# Patient Record
Sex: MALE | Race: WHITE | NOT HISPANIC OR LATINO | Employment: OTHER | URBAN - METROPOLITAN AREA
[De-identification: names, ages, dates, MRNs, and addresses within clinical notes are randomized per-mention and may not be internally consistent; named-entity substitution may affect disease eponyms.]

---

## 2017-07-21 ENCOUNTER — APPOINTMENT (OUTPATIENT)
Dept: LAB | Facility: MEDICAL CENTER | Age: 77
End: 2017-07-21
Attending: FAMILY MEDICINE
Payer: MEDICARE

## 2019-11-06 ENCOUNTER — HOSPITAL ENCOUNTER (OUTPATIENT)
Dept: HOSPITAL 8 - CVU | Age: 79
Discharge: HOME | End: 2019-11-06
Attending: NURSE PRACTITIONER
Payer: MEDICARE

## 2019-11-06 DIAGNOSIS — I34.0: Primary | ICD-10-CM

## 2019-11-06 DIAGNOSIS — R55: ICD-10-CM

## 2019-11-06 PROCEDURE — 93880 EXTRACRANIAL BILAT STUDY: CPT

## 2019-11-06 PROCEDURE — 93306 TTE W/DOPPLER COMPLETE: CPT

## 2019-12-18 VITALS — DIASTOLIC BLOOD PRESSURE: 63 MMHG | SYSTOLIC BLOOD PRESSURE: 127 MMHG

## 2019-12-18 LAB
ANION GAP SERPL CALC-SCNC: 7 MMOL/L (ref 5–15)
BASOPHILS # BLD AUTO: 0.06 X10^3/UL (ref 0–0.1)
BASOPHILS NFR BLD AUTO: 1 % (ref 0–1)
CALCIUM SERPL-MCNC: 8.7 MG/DL (ref 8.5–10.1)
CHLORIDE SERPL-SCNC: 110 MMOL/L (ref 98–107)
CREAT SERPL-MCNC: 0.8 MG/DL (ref 0.7–1.3)
EOSINOPHIL # BLD AUTO: 0.71 X10^3/UL (ref 0–0.4)
EOSINOPHIL NFR BLD AUTO: 13 % (ref 1–7)
ERYTHROCYTE [DISTWIDTH] IN BLOOD BY AUTOMATED COUNT: 13.2 % (ref 9.4–14.8)
LYMPHOCYTES # BLD AUTO: 1.43 X10^3/UL (ref 1–3.4)
LYMPHOCYTES NFR BLD AUTO: 26 % (ref 22–44)
MCH RBC QN AUTO: 32.5 PG (ref 27.5–34.5)
MCHC RBC AUTO-ENTMCNC: 33.6 G/DL (ref 33.2–36.2)
MCV RBC AUTO: 96.6 FL (ref 81–97)
MD: NO
MONOCYTES # BLD AUTO: 0.71 X10^3/UL (ref 0.2–0.8)
MONOCYTES NFR BLD AUTO: 13 % (ref 2–9)
NEUTROPHILS # BLD AUTO: 2.56 X10^3/UL (ref 1.8–6.8)
NEUTROPHILS NFR BLD AUTO: 47 % (ref 42–75)
PLATELET # BLD AUTO: 173 X10^3/UL (ref 130–400)
PMV BLD AUTO: 7.6 FL (ref 7.4–10.4)
RBC # BLD AUTO: 4.88 X10^6/UL (ref 4.38–5.82)

## 2019-12-20 ENCOUNTER — HOSPITAL ENCOUNTER (OUTPATIENT)
Dept: HOSPITAL 8 - CACL | Age: 79
Setting detail: OBSERVATION
LOS: 1 days | Discharge: HOME | End: 2019-12-21
Attending: INTERNAL MEDICINE | Admitting: INTERNAL MEDICINE
Payer: MEDICARE

## 2019-12-20 VITALS — BODY MASS INDEX: 24.71 KG/M2 | HEIGHT: 70 IN | WEIGHT: 172.62 LBS

## 2019-12-20 VITALS — DIASTOLIC BLOOD PRESSURE: 85 MMHG | SYSTOLIC BLOOD PRESSURE: 126 MMHG

## 2019-12-20 VITALS — SYSTOLIC BLOOD PRESSURE: 120 MMHG | DIASTOLIC BLOOD PRESSURE: 80 MMHG

## 2019-12-20 DIAGNOSIS — I49.5: ICD-10-CM

## 2019-12-20 DIAGNOSIS — I44.1: Primary | ICD-10-CM

## 2019-12-20 DIAGNOSIS — R55: ICD-10-CM

## 2019-12-20 DIAGNOSIS — I34.0: ICD-10-CM

## 2019-12-20 DIAGNOSIS — Z79.899: ICD-10-CM

## 2019-12-20 DIAGNOSIS — G47.30: ICD-10-CM

## 2019-12-20 DIAGNOSIS — I26.09: ICD-10-CM

## 2019-12-20 DIAGNOSIS — Z79.82: ICD-10-CM

## 2019-12-20 DIAGNOSIS — I82.409: ICD-10-CM

## 2019-12-20 DIAGNOSIS — I82.411: ICD-10-CM

## 2019-12-20 DIAGNOSIS — E03.9: ICD-10-CM

## 2019-12-20 PROCEDURE — G0378 HOSPITAL OBSERVATION PER HR: HCPCS

## 2019-12-20 PROCEDURE — 36415 COLL VENOUS BLD VENIPUNCTURE: CPT

## 2019-12-20 PROCEDURE — 33208 INSRT HEART PM ATRIAL & VENT: CPT

## 2019-12-20 PROCEDURE — 85025 COMPLETE CBC W/AUTO DIFF WBC: CPT

## 2019-12-20 PROCEDURE — 99157 MOD SED OTHER PHYS/QHP EA: CPT

## 2019-12-20 PROCEDURE — 80048 BASIC METABOLIC PNL TOTAL CA: CPT

## 2019-12-20 PROCEDURE — 99156 MOD SED OTH PHYS/QHP 5/>YRS: CPT

## 2019-12-20 PROCEDURE — 71045 X-RAY EXAM CHEST 1 VIEW: CPT

## 2019-12-20 PROCEDURE — C1779 LEAD, PMKR, TRANSVENOUS VDD: HCPCS

## 2019-12-20 PROCEDURE — C1785 PMKR, DUAL, RATE-RESP: HCPCS

## 2019-12-20 PROCEDURE — 71046 X-RAY EXAM CHEST 2 VIEWS: CPT

## 2019-12-20 PROCEDURE — C1892 INTRO/SHEATH,FIXED,PEEL-AWAY: HCPCS

## 2019-12-20 PROCEDURE — 36005 INJECTION EXT VENOGRAPHY: CPT

## 2019-12-20 RX ADMIN — SODIUM CHLORIDE, PRESERVATIVE FREE SCH ML: 5 INJECTION INTRAVENOUS at 22:00

## 2019-12-21 VITALS — SYSTOLIC BLOOD PRESSURE: 148 MMHG | DIASTOLIC BLOOD PRESSURE: 84 MMHG

## 2019-12-21 VITALS — DIASTOLIC BLOOD PRESSURE: 90 MMHG | SYSTOLIC BLOOD PRESSURE: 143 MMHG

## 2019-12-21 RX ADMIN — SODIUM CHLORIDE, PRESERVATIVE FREE SCH ML: 5 INJECTION INTRAVENOUS at 09:00

## 2020-09-09 ENCOUNTER — HOSPITAL ENCOUNTER (OUTPATIENT)
Dept: HOSPITAL 8 - RAD | Age: 80
Discharge: HOME | End: 2020-09-09
Attending: NURSE PRACTITIONER
Payer: MEDICARE

## 2020-09-09 DIAGNOSIS — G31.9: ICD-10-CM

## 2020-09-09 DIAGNOSIS — I99.8: ICD-10-CM

## 2020-09-09 DIAGNOSIS — Z91.81: ICD-10-CM

## 2020-09-09 DIAGNOSIS — I63.81: Primary | ICD-10-CM

## 2020-09-09 PROCEDURE — 70551 MRI BRAIN STEM W/O DYE: CPT

## 2021-01-15 DIAGNOSIS — Z23 NEED FOR VACCINATION: ICD-10-CM

## 2021-08-04 ENCOUNTER — APPOINTMENT (RX ONLY)
Dept: URBAN - METROPOLITAN AREA CLINIC 22 | Facility: CLINIC | Age: 81
Setting detail: DERMATOLOGY
End: 2021-08-04

## 2021-08-04 DIAGNOSIS — L82.0 INFLAMED SEBORRHEIC KERATOSIS: ICD-10-CM

## 2021-08-04 DIAGNOSIS — D18.0 HEMANGIOMA: ICD-10-CM

## 2021-08-04 DIAGNOSIS — L57.0 ACTINIC KERATOSIS: ICD-10-CM

## 2021-08-04 DIAGNOSIS — D22 MELANOCYTIC NEVI: ICD-10-CM

## 2021-08-04 DIAGNOSIS — Z71.89 OTHER SPECIFIED COUNSELING: ICD-10-CM

## 2021-08-04 DIAGNOSIS — L82.1 OTHER SEBORRHEIC KERATOSIS: ICD-10-CM

## 2021-08-04 DIAGNOSIS — L81.4 OTHER MELANIN HYPERPIGMENTATION: ICD-10-CM

## 2021-08-04 DIAGNOSIS — I83.9 ASYMPTOMATIC VARICOSE VEINS OF LOWER EXTREMITIES: ICD-10-CM

## 2021-08-04 PROBLEM — D18.01 HEMANGIOMA OF SKIN AND SUBCUTANEOUS TISSUE: Status: ACTIVE | Noted: 2021-08-04

## 2021-08-04 PROBLEM — D22.5 MELANOCYTIC NEVI OF TRUNK: Status: ACTIVE | Noted: 2021-08-04

## 2021-08-04 PROBLEM — I83.93 ASYMPTOMATIC VARICOSE VEINS OF BILATERAL LOWER EXTREMITIES: Status: ACTIVE | Noted: 2021-08-04

## 2021-08-04 PROCEDURE — 17000 DESTRUCT PREMALG LESION: CPT | Mod: 59

## 2021-08-04 PROCEDURE — 17003 DESTRUCT PREMALG LES 2-14: CPT | Mod: 59

## 2021-08-04 PROCEDURE — ? LIQUID NITROGEN

## 2021-08-04 PROCEDURE — 99203 OFFICE O/P NEW LOW 30 MIN: CPT | Mod: 25

## 2021-08-04 PROCEDURE — ? COUNSELING

## 2021-08-04 PROCEDURE — ? SUNSCREEN RECOMMENDATIONS

## 2021-08-04 PROCEDURE — 17110 DESTRUCTION B9 LES UP TO 14: CPT

## 2021-08-04 ASSESSMENT — LOCATION SIMPLE DESCRIPTION DERM
LOCATION SIMPLE: LEFT EAR
LOCATION SIMPLE: RIGHT LOWER BACK
LOCATION SIMPLE: LEFT FOREHEAD
LOCATION SIMPLE: LEFT PRETIBIAL REGION
LOCATION SIMPLE: RIGHT PRETIBIAL REGION
LOCATION SIMPLE: RIGHT FOREHEAD
LOCATION SIMPLE: ABDOMEN
LOCATION SIMPLE: LEFT UPPER BACK

## 2021-08-04 ASSESSMENT — LOCATION DETAILED DESCRIPTION DERM
LOCATION DETAILED: LEFT MEDIAL FOREHEAD
LOCATION DETAILED: LEFT SUPERIOR MEDIAL UPPER BACK
LOCATION DETAILED: LEFT RIB CAGE
LOCATION DETAILED: LEFT PROXIMAL PRETIBIAL REGION
LOCATION DETAILED: LEFT SUPERIOR HELIX
LOCATION DETAILED: RIGHT PROXIMAL PRETIBIAL REGION
LOCATION DETAILED: RIGHT SUPERIOR MEDIAL MIDBACK
LOCATION DETAILED: XIPHOID
LOCATION DETAILED: RIGHT INFERIOR FOREHEAD
LOCATION DETAILED: EPIGASTRIC SKIN

## 2021-08-04 ASSESSMENT — LOCATION ZONE DERM
LOCATION ZONE: TRUNK
LOCATION ZONE: FACE
LOCATION ZONE: LEG
LOCATION ZONE: EAR

## 2021-08-04 NOTE — PROCEDURE: LIQUID NITROGEN
Render Note In Bullet Format When Appropriate: No
Post-Care Instructions: I reviewed with the patient in detail post-care instructions. Patient is to wear sunprotection, and avoid picking at any of the treated lesions. Pt may apply Vaseline to crusted or scabbing areas.
Duration Of Freeze Thaw-Cycle (Seconds): 3
Detail Level: Detailed
Number Of Freeze-Thaw Cycles: 2 freeze-thaw cycles
Show Aperture Variable?: Yes
Consent: The patient's consent was obtained including but not limited to risks of crusting, scabbing, blistering, scarring, darker or lighter pigmentary change, recurrence, incomplete removal and infection.
Medical Necessity Information: It is in your best interest to select a reason for this procedure from the list below. All of these items fulfill various CMS LCD requirements except the new and changing color options.
Medical Necessity Clause: This procedure was medically necessary because the lesions that were treated were:
Number Of Freeze-Thaw Cycles: 3 freeze-thaw cycles

## 2021-12-14 ENCOUNTER — APPOINTMENT (RX ONLY)
Dept: URBAN - METROPOLITAN AREA CLINIC 6 | Facility: CLINIC | Age: 81
Setting detail: DERMATOLOGY
End: 2021-12-14

## 2021-12-14 DIAGNOSIS — L72.0 EPIDERMAL CYST: ICD-10-CM

## 2021-12-14 PROCEDURE — 10060 I&D ABSCESS SIMPLE/SINGLE: CPT

## 2021-12-14 PROCEDURE — ? COUNSELING

## 2021-12-14 PROCEDURE — ? ADDITIONAL NOTES

## 2021-12-14 PROCEDURE — ? INCISION AND DRAINAGE

## 2021-12-14 ASSESSMENT — LOCATION SIMPLE DESCRIPTION DERM: LOCATION SIMPLE: LEFT AXILLARY VAULT

## 2021-12-14 ASSESSMENT — LOCATION DETAILED DESCRIPTION DERM: LOCATION DETAILED: LEFT AXILLARY VAULT

## 2021-12-14 ASSESSMENT — LOCATION ZONE DERM: LOCATION ZONE: AXILLAE

## 2021-12-14 NOTE — PROCEDURE: INCISION AND DRAINAGE

## 2021-12-14 NOTE — PROCEDURE: ADDITIONAL NOTES
Additional Notes: Streaky erythema extending from inflamed epidermoid cyst, currently taking doxycycline 100mg BID (day 6). \\n\\nRecommended loose dressing to promote drainage, applied folded 4x4 gauze and secured with paper tape. Recommended changing at least once daily or when visibly soiled with blood. \\n\\nWill follow up in one week and if lack improvement, will consider course of Bactrim +/- ILK
Render Risk Assessment In Note?: no
Detail Level: Detailed

## 2021-12-22 ENCOUNTER — APPOINTMENT (RX ONLY)
Dept: URBAN - METROPOLITAN AREA CLINIC 6 | Facility: CLINIC | Age: 81
Setting detail: DERMATOLOGY
End: 2021-12-22

## 2021-12-22 DIAGNOSIS — L72.0 EPIDERMAL CYST: ICD-10-CM

## 2021-12-22 PROCEDURE — 11900 INJECT SKIN LESIONS </W 7: CPT | Mod: 79

## 2021-12-22 PROCEDURE — ? COUNSELING

## 2021-12-22 PROCEDURE — ? INTRALESIONAL KENALOG

## 2021-12-22 PROCEDURE — ? ADDITIONAL NOTES

## 2021-12-22 ASSESSMENT — LOCATION SIMPLE DESCRIPTION DERM: LOCATION SIMPLE: LEFT AXILLARY VAULT

## 2021-12-22 ASSESSMENT — LOCATION ZONE DERM: LOCATION ZONE: AXILLAE

## 2021-12-22 ASSESSMENT — LOCATION DETAILED DESCRIPTION DERM: LOCATION DETAILED: LEFT AXILLARY VAULT

## 2021-12-22 NOTE — PROCEDURE: INTRALESIONAL KENALOG
Administered By (Optional): Dr. Del Real
Detail Level: Detailed
Consent: The risks of atrophy were reviewed with the patient.
X Size Of Lesion In Cm (Optional): 0
Expiration Date For Kenalog (Optional): MAR 2023
Total Volume (Ccs): 1.0
Kenalog Preparation: Kenalog
Validate Note Data When Using Inventory: Yes
Concentration Of Kenalog Solution Injected (Mg/Ml): 10.0
Include Z78.9 (Other Specified Conditions Influencing Health Status) As An Associated Diagnosis?: No
Lot # For Kenalog (Optional): [id number]
Ndc# For Kenalog Only: 9372-7210-87
Medical Necessity Clause: This procedure was medically necessary because the lesions that were treated were:
Treatment Number (Optional): 1

## 2021-12-22 NOTE — PROCEDURE: ADDITIONAL NOTES
Additional Notes: No current signs of infection, recently finished course of doxycycline followed by I&D one week ago. Due to residual inflammation, performed ILK and will monitor for resolution.
Render Risk Assessment In Note?: no
Detail Level: Detailed

## 2022-01-20 ENCOUNTER — APPOINTMENT (RX ONLY)
Dept: URBAN - METROPOLITAN AREA CLINIC 6 | Facility: CLINIC | Age: 82
Setting detail: DERMATOLOGY
End: 2022-01-20

## 2022-01-20 DIAGNOSIS — L72.0 EPIDERMAL CYST: ICD-10-CM | Status: RESOLVED

## 2022-01-20 PROCEDURE — 99212 OFFICE O/P EST SF 10 MIN: CPT

## 2022-01-20 PROCEDURE — ? COUNSELING

## 2022-01-20 ASSESSMENT — LOCATION DETAILED DESCRIPTION DERM: LOCATION DETAILED: LEFT AXILLARY VAULT

## 2022-01-20 ASSESSMENT — LOCATION SIMPLE DESCRIPTION DERM: LOCATION SIMPLE: LEFT AXILLARY VAULT

## 2022-01-20 ASSESSMENT — LOCATION ZONE DERM: LOCATION ZONE: AXILLAE

## 2022-09-13 PROBLEM — N40.1 BENIGN PROSTATIC HYPERPLASIA WITH URINARY FREQUENCY: Status: ACTIVE | Noted: 2022-09-13

## 2022-09-13 PROBLEM — Z86.73 HISTORY OF CVA (CEREBROVASCULAR ACCIDENT): Status: ACTIVE | Noted: 2022-09-13

## 2022-09-13 PROBLEM — I25.2 HISTORY OF MI (MYOCARDIAL INFARCTION): Status: ACTIVE | Noted: 2022-09-13

## 2022-09-13 PROBLEM — E03.9 ACQUIRED HYPOTHYROIDISM: Status: ACTIVE | Noted: 2022-09-13

## 2022-09-13 PROBLEM — I48.20 CHRONIC ATRIAL FIBRILLATION (HCC): Status: ACTIVE | Noted: 2022-09-13

## 2022-09-13 PROBLEM — M15.9 OSTEOARTHRITIS OF MULTIPLE JOINTS: Status: ACTIVE | Noted: 2022-09-13

## 2022-09-13 PROBLEM — G47.01 INSOMNIA DUE TO MEDICAL CONDITION: Status: ACTIVE | Noted: 2022-09-13

## 2022-09-13 PROBLEM — Z95.0 S/P PLACEMENT OF CARDIAC PACEMAKER: Status: ACTIVE | Noted: 2022-09-13

## 2022-09-13 PROBLEM — D68.318 ACQUIRED CIRCULATING ANTICOAGULANTS (HCC): Status: ACTIVE | Noted: 2022-09-13

## 2022-09-13 PROBLEM — R35.0 BENIGN PROSTATIC HYPERPLASIA WITH URINARY FREQUENCY: Status: ACTIVE | Noted: 2022-09-13

## 2022-09-13 PROBLEM — Z71.89 ADVANCE CARE PLANNING: Status: ACTIVE | Noted: 2022-09-13

## 2022-09-13 PROBLEM — F01.50 VASCULAR DEMENTIA WITHOUT BEHAVIORAL DISTURBANCE (HCC): Status: ACTIVE | Noted: 2022-09-13

## 2022-09-13 PROBLEM — R60.0 LOCALIZED EDEMA: Status: ACTIVE | Noted: 2022-09-13

## 2022-09-27 ENCOUNTER — APPOINTMENT (RX ONLY)
Dept: URBAN - METROPOLITAN AREA CLINIC 22 | Facility: CLINIC | Age: 82
Setting detail: DERMATOLOGY
End: 2022-09-27

## 2022-09-27 DIAGNOSIS — L82.1 OTHER SEBORRHEIC KERATOSIS: ICD-10-CM

## 2022-09-27 DIAGNOSIS — D22 MELANOCYTIC NEVI: ICD-10-CM

## 2022-09-27 DIAGNOSIS — I83.9 ASYMPTOMATIC VARICOSE VEINS OF LOWER EXTREMITIES: ICD-10-CM

## 2022-09-27 DIAGNOSIS — L91.8 OTHER HYPERTROPHIC DISORDERS OF THE SKIN: ICD-10-CM

## 2022-09-27 DIAGNOSIS — L57.0 ACTINIC KERATOSIS: ICD-10-CM

## 2022-09-27 DIAGNOSIS — L81.4 OTHER MELANIN HYPERPIGMENTATION: ICD-10-CM

## 2022-09-27 DIAGNOSIS — R60.0 LOCALIZED EDEMA: ICD-10-CM

## 2022-09-27 DIAGNOSIS — D18.0 HEMANGIOMA: ICD-10-CM

## 2022-09-27 DIAGNOSIS — Z71.89 OTHER SPECIFIED COUNSELING: ICD-10-CM

## 2022-09-27 PROBLEM — D22.5 MELANOCYTIC NEVI OF TRUNK: Status: ACTIVE | Noted: 2022-09-27

## 2022-09-27 PROBLEM — I83.93 ASYMPTOMATIC VARICOSE VEINS OF BILATERAL LOWER EXTREMITIES: Status: ACTIVE | Noted: 2022-09-27

## 2022-09-27 PROBLEM — D18.01 HEMANGIOMA OF SKIN AND SUBCUTANEOUS TISSUE: Status: ACTIVE | Noted: 2022-09-27

## 2022-09-27 PROCEDURE — 17000 DESTRUCT PREMALG LESION: CPT | Mod: 59

## 2022-09-27 PROCEDURE — 99213 OFFICE O/P EST LOW 20 MIN: CPT | Mod: 25

## 2022-09-27 PROCEDURE — ? COUNSELING

## 2022-09-27 PROCEDURE — ? SKIN TAG REMOVAL

## 2022-09-27 PROCEDURE — ? SUNSCREEN RECOMMENDATIONS

## 2022-09-27 PROCEDURE — ? ADDITIONAL NOTES

## 2022-09-27 PROCEDURE — ? LIQUID NITROGEN

## 2022-09-27 PROCEDURE — 17003 DESTRUCT PREMALG LES 2-14: CPT

## 2022-09-27 PROCEDURE — 11200 RMVL SKIN TAGS UP TO&INC 15: CPT

## 2022-09-27 ASSESSMENT — LOCATION SIMPLE DESCRIPTION DERM
LOCATION SIMPLE: LEFT HAND
LOCATION SIMPLE: RIGHT PRETIBIAL REGION
LOCATION SIMPLE: LEFT PRETIBIAL REGION
LOCATION SIMPLE: RIGHT FOREARM
LOCATION SIMPLE: RIGHT LOWER BACK
LOCATION SIMPLE: LEFT EAR
LOCATION SIMPLE: LEFT UPPER BACK
LOCATION SIMPLE: RIGHT EAR
LOCATION SIMPLE: ABDOMEN
LOCATION SIMPLE: RIGHT ANTERIOR NECK
LOCATION SIMPLE: LEFT FOREARM

## 2022-09-27 ASSESSMENT — LOCATION ZONE DERM
LOCATION ZONE: ARM
LOCATION ZONE: EAR
LOCATION ZONE: NECK
LOCATION ZONE: HAND
LOCATION ZONE: LEG
LOCATION ZONE: TRUNK

## 2022-09-27 ASSESSMENT — LOCATION DETAILED DESCRIPTION DERM
LOCATION DETAILED: LEFT PROXIMAL DORSAL FOREARM
LOCATION DETAILED: RIGHT PROXIMAL PRETIBIAL REGION
LOCATION DETAILED: LEFT SUPERIOR MEDIAL UPPER BACK
LOCATION DETAILED: EPIGASTRIC SKIN
LOCATION DETAILED: LEFT THENAR EMINENCE
LOCATION DETAILED: LEFT SUPERIOR HELIX
LOCATION DETAILED: LEFT DISTAL DORSAL FOREARM
LOCATION DETAILED: RIGHT SUPERIOR MEDIAL MIDBACK
LOCATION DETAILED: RIGHT CLAVICULAR NECK
LOCATION DETAILED: RIGHT DISTAL DORSAL FOREARM
LOCATION DETAILED: RIGHT ANTITRAGUS
LOCATION DETAILED: LEFT PROXIMAL PRETIBIAL REGION

## 2022-09-27 NOTE — PROCEDURE: SKIN TAG REMOVAL
Medical Necessity Information: It is in your best interest to select a reason for this procedure from the list below. All of these items fulfill various CMS LCD requirements except the new and changing color options.
Detail Level: Detailed
Consent: Written consent obtained and the risks of skin tag removal was reviewed with the patient including but not limited to bleeding, pigmentary change, infection, pain, and remote possibility of scarring.
Add Associated Diagnoses If Applicable When Selecting Medical Necessity: Yes
Medical Necessity Clause: This procedure was medically necessary because the lesions that were treated were:
Include Z78.9 (Other Specified Conditions Influencing Health Status) As An Associated Diagnosis?: No

## 2022-09-27 NOTE — PROCEDURE: ADDITIONAL NOTES
Additional Notes: Adv pt to see vein nv.
Render Risk Assessment In Note?: no
Detail Level: Simple
Additional Notes: Follow up with his pcp.

## 2022-09-27 NOTE — HPI: FULL BODY SKIN EXAMINATION
----- Message from Birgit Manzanares sent at 9/20/2017 10:36 AM CDT -----  Contact: [phone]  PT WANTS TO SEE IF ANJALI CAN PUT A LAB ORDER IN FOR HER, PLEASE CALL. THANKS!  
How Severe Are Your Spot(S)?: moderate
What Type Of Note Output Would You Prefer (Optional)?: Standard Output
What Is The Reason For Today's Visit?: Full Body Skin Examination
What Is The Reason For Today's Visit? (Being Monitored For X): concerning skin lesions on an annual basis

## 2022-09-27 NOTE — PROCEDURE: LIQUID NITROGEN
Detail Level: Detailed
Render Post-Care Instructions In Note?: no
Number Of Freeze-Thaw Cycles: 2 freeze-thaw cycles
Consent: The patient's consent was obtained including but not limited to risks of crusting, scabbing, blistering, scarring, darker or lighter pigmentary change, recurrence, incomplete removal and infection.
Show Aperture Variable?: Yes
Duration Of Freeze Thaw-Cycle (Seconds): 3
Post-Care Instructions: I reviewed with the patient in detail post-care instructions. Patient is to wear sunprotection, and avoid picking at any of the treated lesions. Pt may apply Vaseline to crusted or scabbing areas.

## 2022-11-04 PROBLEM — G47.33 OBSTRUCTIVE SLEEP APNEA SYNDROME: Status: ACTIVE | Noted: 2022-11-04

## 2023-09-27 ENCOUNTER — APPOINTMENT (RX ONLY)
Dept: URBAN - METROPOLITAN AREA CLINIC 22 | Facility: CLINIC | Age: 83
Setting detail: DERMATOLOGY
End: 2023-09-27

## 2023-09-27 DIAGNOSIS — L82.1 OTHER SEBORRHEIC KERATOSIS: ICD-10-CM

## 2023-09-27 DIAGNOSIS — L57.0 ACTINIC KERATOSIS: ICD-10-CM

## 2023-09-27 DIAGNOSIS — D18.0 HEMANGIOMA: ICD-10-CM

## 2023-09-27 DIAGNOSIS — Z71.89 OTHER SPECIFIED COUNSELING: ICD-10-CM

## 2023-09-27 DIAGNOSIS — L81.4 OTHER MELANIN HYPERPIGMENTATION: ICD-10-CM

## 2023-09-27 DIAGNOSIS — D22 MELANOCYTIC NEVI: ICD-10-CM

## 2023-09-27 PROBLEM — D22.5 MELANOCYTIC NEVI OF TRUNK: Status: ACTIVE | Noted: 2023-09-27

## 2023-09-27 PROBLEM — D18.01 HEMANGIOMA OF SKIN AND SUBCUTANEOUS TISSUE: Status: ACTIVE | Noted: 2023-09-27

## 2023-09-27 PROCEDURE — ? SUNSCREEN RECOMMENDATIONS

## 2023-09-27 PROCEDURE — ? LIQUID NITROGEN

## 2023-09-27 PROCEDURE — 17000 DESTRUCT PREMALG LESION: CPT

## 2023-09-27 PROCEDURE — 99213 OFFICE O/P EST LOW 20 MIN: CPT | Mod: 25

## 2023-09-27 PROCEDURE — 17003 DESTRUCT PREMALG LES 2-14: CPT

## 2023-09-27 PROCEDURE — ? COUNSELING

## 2023-09-27 ASSESSMENT — LOCATION DETAILED DESCRIPTION DERM
LOCATION DETAILED: EPIGASTRIC SKIN
LOCATION DETAILED: LEFT SUPERIOR MEDIAL UPPER BACK
LOCATION DETAILED: RIGHT INFERIOR POSTERIOR HELIX
LOCATION DETAILED: RIGHT SUPERIOR HELIX
LOCATION DETAILED: RIGHT SUPERIOR MEDIAL MIDBACK
LOCATION DETAILED: RIGHT SUPERIOR POSTERIOR HELIX

## 2023-09-27 ASSESSMENT — LOCATION ZONE DERM
LOCATION ZONE: TRUNK
LOCATION ZONE: EAR

## 2023-09-27 ASSESSMENT — LOCATION SIMPLE DESCRIPTION DERM
LOCATION SIMPLE: ABDOMEN
LOCATION SIMPLE: RIGHT EAR
LOCATION SIMPLE: RIGHT LOWER BACK
LOCATION SIMPLE: LEFT UPPER BACK

## 2023-11-29 ENCOUNTER — PATIENT MESSAGE (OUTPATIENT)
Dept: HEALTH INFORMATION MANAGEMENT | Facility: OTHER | Age: 83
End: 2023-11-29

## 2024-02-21 ENCOUNTER — APPOINTMENT (OUTPATIENT)
Dept: RADIOLOGY | Facility: MEDICAL CENTER | Age: 84
End: 2024-02-21
Attending: EMERGENCY MEDICINE
Payer: MEDICARE

## 2024-02-21 ENCOUNTER — HOSPITAL ENCOUNTER (EMERGENCY)
Facility: MEDICAL CENTER | Age: 84
End: 2024-02-21
Attending: EMERGENCY MEDICINE
Payer: MEDICARE

## 2024-02-21 ENCOUNTER — APPOINTMENT (OUTPATIENT)
Dept: RADIOLOGY | Facility: MEDICAL CENTER | Age: 84
End: 2024-02-21
Payer: MEDICARE

## 2024-02-21 VITALS
WEIGHT: 158 LBS | TEMPERATURE: 97.9 F | OXYGEN SATURATION: 95 % | SYSTOLIC BLOOD PRESSURE: 157 MMHG | RESPIRATION RATE: 19 BRPM | DIASTOLIC BLOOD PRESSURE: 77 MMHG | HEART RATE: 79 BPM

## 2024-02-21 DIAGNOSIS — F03.90 DEMENTIA, UNSPECIFIED DEMENTIA SEVERITY, UNSPECIFIED DEMENTIA TYPE, UNSPECIFIED WHETHER BEHAVIORAL, PSYCHOTIC, OR MOOD DISTURBANCE OR ANXIETY (HCC): ICD-10-CM

## 2024-02-21 DIAGNOSIS — R07.9 CHEST PAIN, UNSPECIFIED TYPE: ICD-10-CM

## 2024-02-21 DIAGNOSIS — T17.308A CHOKING, INITIAL ENCOUNTER: ICD-10-CM

## 2024-02-21 LAB
ALBUMIN SERPL BCP-MCNC: 3.8 G/DL (ref 3.2–4.9)
ALBUMIN/GLOB SERPL: 1.3 G/DL
ALP SERPL-CCNC: 52 U/L (ref 30–99)
ALT SERPL-CCNC: 15 U/L (ref 2–50)
ANION GAP SERPL CALC-SCNC: 9 MMOL/L (ref 7–16)
AST SERPL-CCNC: 22 U/L (ref 12–45)
BASOPHILS # BLD AUTO: 1.7 % (ref 0–1.8)
BASOPHILS # BLD: 0.08 K/UL (ref 0–0.12)
BILIRUB SERPL-MCNC: 0.9 MG/DL (ref 0.1–1.5)
BUN SERPL-MCNC: 17 MG/DL (ref 8–22)
CALCIUM ALBUM COR SERPL-MCNC: 8.7 MG/DL (ref 8.5–10.5)
CALCIUM SERPL-MCNC: 8.5 MG/DL (ref 8.5–10.5)
CHLORIDE SERPL-SCNC: 104 MMOL/L (ref 96–112)
CO2 SERPL-SCNC: 24 MMOL/L (ref 20–33)
CREAT SERPL-MCNC: 0.83 MG/DL (ref 0.5–1.4)
EKG IMPRESSION: NORMAL
EOSINOPHIL # BLD AUTO: 0.48 K/UL (ref 0–0.51)
EOSINOPHIL NFR BLD: 10 % (ref 0–6.9)
ERYTHROCYTE [DISTWIDTH] IN BLOOD BY AUTOMATED COUNT: 47.6 FL (ref 35.9–50)
GFR SERPLBLD CREATININE-BSD FMLA CKD-EPI: 87 ML/MIN/1.73 M 2
GLOBULIN SER CALC-MCNC: 2.9 G/DL (ref 1.9–3.5)
GLUCOSE SERPL-MCNC: 120 MG/DL (ref 65–99)
HCT VFR BLD AUTO: 38.8 % (ref 42–52)
HGB BLD-MCNC: 12.5 G/DL (ref 14–18)
IMM GRANULOCYTES # BLD AUTO: 0.02 K/UL (ref 0–0.11)
IMM GRANULOCYTES NFR BLD AUTO: 0.4 % (ref 0–0.9)
LYMPHOCYTES # BLD AUTO: 1.66 K/UL (ref 1–4.8)
LYMPHOCYTES NFR BLD: 34.5 % (ref 22–41)
MCH RBC QN AUTO: 30.5 PG (ref 27–33)
MCHC RBC AUTO-ENTMCNC: 32.2 G/DL (ref 32.3–36.5)
MCV RBC AUTO: 94.6 FL (ref 81.4–97.8)
MONOCYTES # BLD AUTO: 0.66 K/UL (ref 0–0.85)
MONOCYTES NFR BLD AUTO: 13.7 % (ref 0–13.4)
NEUTROPHILS # BLD AUTO: 1.91 K/UL (ref 1.82–7.42)
NEUTROPHILS NFR BLD: 39.7 % (ref 44–72)
NRBC # BLD AUTO: 0 K/UL
NRBC BLD-RTO: 0 /100 WBC (ref 0–0.2)
PLATELET # BLD AUTO: 170 K/UL (ref 164–446)
PMV BLD AUTO: 10.2 FL (ref 9–12.9)
POTASSIUM SERPL-SCNC: 3.7 MMOL/L (ref 3.6–5.5)
PROT SERPL-MCNC: 6.7 G/DL (ref 6–8.2)
RBC # BLD AUTO: 4.1 M/UL (ref 4.7–6.1)
SODIUM SERPL-SCNC: 137 MMOL/L (ref 135–145)
TROPONIN T SERPL-MCNC: 16 NG/L (ref 6–19)
WBC # BLD AUTO: 4.8 K/UL (ref 4.8–10.8)

## 2024-02-21 PROCEDURE — 80053 COMPREHEN METABOLIC PANEL: CPT

## 2024-02-21 PROCEDURE — 71045 X-RAY EXAM CHEST 1 VIEW: CPT

## 2024-02-21 PROCEDURE — 36415 COLL VENOUS BLD VENIPUNCTURE: CPT

## 2024-02-21 PROCEDURE — 93005 ELECTROCARDIOGRAM TRACING: CPT

## 2024-02-21 PROCEDURE — 85025 COMPLETE CBC W/AUTO DIFF WBC: CPT

## 2024-02-21 PROCEDURE — 84484 ASSAY OF TROPONIN QUANT: CPT

## 2024-02-21 PROCEDURE — 99284 EMERGENCY DEPT VISIT MOD MDM: CPT

## 2024-02-21 PROCEDURE — 93005 ELECTROCARDIOGRAM TRACING: CPT | Performed by: EMERGENCY MEDICINE

## 2024-02-21 NOTE — ED NOTES
Report received. Pt denies pain at this time. Bed alarm in place call light in reach. Provided a urinal.

## 2024-02-21 NOTE — ED TRIAGE NOTES
Chief Complaint   Patient presents with    Chest Pain     Per EMS report, pt was sitting up eating lunch when he began complaining of chest pain.     N/V     At time of onset of chest pain, pt complaining of nausea with vomiting. Staff believes he may have some food dislodged and is trying to clear it.        Pt BIBA from [facility]. Pt is alert and oriented 0x. Pt is combative, immediately spitting on the floor upon arrival instead of the emesis bag provided. Pt has a ventricular pacer w hx of afib. Pt is GCS 14.     /69   Pulse 64   Temp 36.7 °C (98.1 °F) (Temporal)   Resp 14   Wt 71.7 kg (158 lb)   SpO2 97%

## 2024-02-21 NOTE — ED PROVIDER NOTES
CHIEF COMPLAINT  Chief Complaint   Patient presents with    Chest Pain     Per EMS report, pt was sitting up eating lunch when he began complaining of chest pain.     N/V     At time of onset of chest pain, pt complaining of nausea with vomiting. Staff believes he may have some food dislodged and is trying to clear it.        LIMITATION TO HISTORY   Select: Dementia    HPI    Longan Twenty-Four is a 83 y.o. male who presents to the Emergency Department for evaluation of chest pain onset earlier today. The patient explains that he was eating lunch in an upright position earlier today when he began experiencing chest pain per the nurses note according to the nurses it sounds like the patient may have gotten a food impaction or was choking.  The staff at the care facility was concerned and called an ambulance.  On arrival here the patient has no complaints he is apparently at baseline for his dementia.. The patient was unsure about his name and his current location when asked.     OUTSIDE HISTORIAN(S):  Select: History is obtained from EMS report and nursing staff patient is unable to find any history    EXTERNAL RECORDS REVIEWED  Select: None     Medications    Current Outpatient Medications  Medication Sig   alfuzosin ER (UROXATRAL) 10 MG 24 hr tablet alfuzosin ER 10 mg tablet,extended release 24 hr   atorvastatin (LIPITOR) 20 MG tablet atorvastatin 20 mg tablet   clopidogrel (PLAVIX) 75 MG tablet clopidogrel 75 mg tablet   digoxin (LANOXIN) 125 MCG tablet digoxin 125 mcg (0.125 mg) tablet   finasteride (PROSCAR) 5 MG tablet finasteride 5 mg tablet  TAKE 1 TABLET BY MOUTH DAILY   fluticasone (FLONASE) 50 MCG/ACT nasal spray fluticasone propionate 50 mcg/actuation nasal spray,suspension   hydrocortisone (Proctozone-HC) 2.5 % CREA Proctozone-HC 2.5 % topical cream perineal applicator  APPLY SMALL AMOUNT RECTALLY TO THE AFFECTED AREA TWICE DAILY FOR 10 DAYS   levothyroxine (SYNTHROID, LEVOTHROID) 100 MCG tablet  levothyroxine 100 mcg tablet   omeprazole (PriLOSEC) 20 MG capsule omeprazole 20 mg capsule,delayed release    Current Outpatient Medications:   alfuzosin ER (UROXATRAL) 10 MG 24 hr tablet, alfuzosin ER 10 mg tablet,extended release 24 hr, Disp: , Rfl:   atorvastatin (LIPITOR) 20 MG tablet, atorvastatin 20 mg tablet, Disp: , Rfl:   clopidogrel (PLAVIX) 75 MG tablet, clopidogrel 75 mg tablet, Disp: , Rfl:   digoxin (LANOXIN) 125 MCG tablet, digoxin 125 mcg (0.125 mg) tablet, Disp: , Rfl:   finasteride (PROSCAR) 5 MG tablet, finasteride 5 mg tablet TAKE 1 TABLET BY MOUTH DAILY, Disp: , Rfl:   fluticasone (FLONASE) 50 MCG/ACT nasal spray, fluticasone propionate 50 mcg/actuation nasal spray,suspension, Disp: , Rfl:   hydrocortisone (Proctozone-HC) 2.5 % CREA, Proctozone-HC 2.5 % topical cream perineal applicator APPLY SMALL AMOUNT RECTALLY TO THE AFFECTED AREA TWICE DAILY FOR 10 DAYS, Disp: , Rfl:   levothyroxine (SYNTHROID, LEVOTHROID) 100 MCG tablet, levothyroxine 100 mcg tablet, Disp: , Rfl:   omeprazole (PriLOSEC) 20 MG capsule, omeprazole 20 mg capsule,delayed release, Disp: , Rfl:    PMH  Past Medical History:  Diagnosis Date   Arthritis   Hearing loss   Heart murmur   Irregular heartbeat   Memory loss   Pacemaker   Sleep apnea   Stroke (CMS/HCC)    Past Surgical History  Procedure Laterality Date   PACEMAKER INSERTION 12/2019   TONSILLECTOMY    Family History  Problem Relation Age of Onset   Cancer Other  Siblings, parents    Family Status  Relation Name Status   Other (Not Specified)    Social History  Tobacco Use   Smoking status: Former  Years: 18.00  Types: Cigarettes   Smokeless tobacco: Never  Substance Use Topics   Alcohol use: Yes    PHYSICAL EXAM  VITAL SIGNS:BP (!) 164/74   Pulse 63   Temp 36.7 °C (98.1 °F) (Temporal)   Resp 20   Wt 71.7 kg (158 lb)   SpO2 95%       Constitutional: Elderly in appearance no acute distress   HENT: Normocephalic, Atraumatic, Bilateral external ears normal, dry mucous  membranes.   Eyes:  conjunctiva are normal.   Neck: Supple.  Nontender midline  Cardiovascular: Regular rate and rhythm without murmurs gallops or rubs.   Thorax & Lungs: No respiratory distress. Breathing comfortably. Lungs are clear to auscultation bilaterally, there are no wheezes no rales. Chest wall is nontender.  Abdomen: Soft, non distended, non tender   Skin: Warm, Dry, No erythema,   Back: No tenderness, No CVA tenderness.  Musculoskeletal: No clubbing cyanosis or edema good range of motion   Neurologic: Alert and oriented to person  but not to time or events, normal sensation moving all extremities appears normal, able to ambulate with assistance, apparently baseline mental status per facility   Psychiatric: Patient is very pleasant and appropriate    DIAGNOSTIC STUDIES / PROCEDURES    LABS  Results for orders placed or performed during the hospital encounter of 02/21/24   CBC with Differential   Result Value Ref Range    WBC 4.8 4.8 - 10.8 K/uL    RBC 4.10 (L) 4.70 - 6.10 M/uL    Hemoglobin 12.5 (L) 14.0 - 18.0 g/dL    Hematocrit 38.8 (L) 42.0 - 52.0 %    MCV 94.6 81.4 - 97.8 fL    MCH 30.5 27.0 - 33.0 pg    MCHC 32.2 (L) 32.3 - 36.5 g/dL    RDW 47.6 35.9 - 50.0 fL    Platelet Count 170 164 - 446 K/uL    MPV 10.2 9.0 - 12.9 fL    Neutrophils-Polys 39.70 (L) 44.00 - 72.00 %    Lymphocytes 34.50 22.00 - 41.00 %    Monocytes 13.70 (H) 0.00 - 13.40 %    Eosinophils 10.00 (H) 0.00 - 6.90 %    Basophils 1.70 0.00 - 1.80 %    Immature Granulocytes 0.40 0.00 - 0.90 %    Nucleated RBC 0.00 0.00 - 0.20 /100 WBC    Neutrophils (Absolute) 1.91 1.82 - 7.42 K/uL    Lymphs (Absolute) 1.66 1.00 - 4.80 K/uL    Monos (Absolute) 0.66 0.00 - 0.85 K/uL    Eos (Absolute) 0.48 0.00 - 0.51 K/uL    Baso (Absolute) 0.08 0.00 - 0.12 K/uL    Immature Granulocytes (abs) 0.02 0.00 - 0.11 K/uL    NRBC (Absolute) 0.00 K/uL   Complete Metabolic Panel (CMP)   Result Value Ref Range    Sodium 137 135 - 145 mmol/L    Potassium 3.7 3.6 - 5.5  mmol/L    Chloride 104 96 - 112 mmol/L    Co2 24 20 - 33 mmol/L    Anion Gap 9.0 7.0 - 16.0    Glucose 120 (H) 65 - 99 mg/dL    Bun 17 8 - 22 mg/dL    Creatinine 0.83 0.50 - 1.40 mg/dL    Calcium 8.5 8.5 - 10.5 mg/dL    Correct Calcium 8.7 8.5 - 10.5 mg/dL    AST(SGOT) 22 12 - 45 U/L    ALT(SGPT) 15 2 - 50 U/L    Alkaline Phosphatase 52 30 - 99 U/L    Total Bilirubin 0.9 0.1 - 1.5 mg/dL    Albumin 3.8 3.2 - 4.9 g/dL    Total Protein 6.7 6.0 - 8.2 g/dL    Globulin 2.9 1.9 - 3.5 g/dL    A-G Ratio 1.3 g/dL   Troponins NOW   Result Value Ref Range    Troponin T 16 6 - 19 ng/L   ESTIMATED GFR   Result Value Ref Range    GFR (CKD-EPI) 87 >60 mL/min/1.73 m 2   EKG   Result Value Ref Range    Report       [facility] Emergency Dept.    Test Date:  2024  Pt Name:    URIEL EID                Department: ER  MRN:        [id number]                      Room:       [facility]  Gender:     M                            Technician: [id number]  :        [id number]                   Requested By:ER TRIAGE PROTOCOL  Order #:    [id number]                    Reading MD: TAURUS KINNEY MD    Measurements  Intervals                                Axis  Rate:       65                           P:          57  IA:         123                          QRS:        -88  QRSD:       191                          T:          94  QT:         496  QTc:        516    Interpretive Statements  Atrial-sensed ventricular-paced complexes  No further analysis attempted due to paced rhythm  No previous ECG available for comparison  Electronically Signed On 2024 21:26:31 PST by TAURUS KINNEY MD       EKG:   I have independently interpreted this EKG as detailed above.    RADIOLOGY  I have independently interpreted the diagnostic imaging associated with this visit and am waiting the final reading from the radiologist.   My preliminary interpretation is as follows: No significant infiltrates on chest x-ray  Radiologist  interpretation:   DX-CHEST-PORTABLE (1 VIEW)   Final Result      Moderate enlargement of the cardiomediastinal silhouette without acute cardiac pulmonary abnormality.         COURSE & MEDICAL DECISION MAKING    ED COURSE:    ED Observation Status? No, The patient does not qualify for observation status    INTERVENTIONS BY ME:    2:43 PM - Patient seen and examined at bedside. Discussed plan of care, including radiology, lab analysis, and an EKG. Patient agrees to the plan of care. Ordered for DX-Chest, EKG, Troponin, CMP, and CBC w/ Diff to evaluate his symptoms.     3:28 PM - Patient was reevaluated at bedside. The patient was able to ambulate well and received fluids. I discussed plan for discharge and follow up as outlined below. The patient is stable for discharge at this time and will return for any new or worsening symptoms. Patient verbalizes understanding and support with my plan for discharge.     INITIAL ASSESSMENT, COURSE AND PLAN  Care Narrative: Patient presented the emergency department for evaluation.  Per ER protocols the patient had a chest pain laboratory studies chest x-ray and EKG ordered.  Upon my assessment the patient was other otherwise asymptomatic and laboratory studies had returned.  Patient has mild anemia with a hemoglobin of 12.5 platelet count is normal glucose slightly elevated at 120.  Troponin initially was normal.  I canceled the second troponin.  Patient was able to tolerate p.o. fluids and has no complaints.  Most likely the patient had a slight food impaction that caused the discomfort.  Initially it was reported that the patient was spitting up after trying to drink anything.  At this point the patient otherwise clinically appropriate he has a negative workup and I do feel the patient is stable for transfer back to his care facility.     ADDITIONAL PROBLEM LIST  1.  Dementia    DISPOSITION AND DISCUSSIONS    The patient will return for new or worsening symptoms and is stable at  the time of discharge.    It was noticed that the patient's blood pressure was greater than 120/80 on today's visit. At this point, most likely related to reactive hypertension, secondary to the emergency visit itself. I have recommend the patient followup with his primary care physician for recheck of his blood pressure.       DISPOSITION:  Patient will be discharged home in stable condition.    FOLLOW UP:  Your PCP    FINAL DIAGNOSIS  1. Choking, initial encounter    2. Chest pain, unspecified type    3. Dementia, unspecified dementia severity, unspecified dementia type, unspecified whether behavioral, psychotic, or mood disturbance or anxiety (HCC)       Kristofer RODRIGUES (Scribe), am scribing for, and in the presence of, Warren Harden M.D..    Electronically signed by: Kristofer Quiroga (Scribe), 2/21/2024    IWarren M.D. personally performed the services described in this documentation, as scribed by Kristofer Quiroga in my presence, and it is both accurate and complete.     Electronically signed by: Warren Harden M.D.,9:27 PM 02/21/24

## 2024-02-22 NOTE — ED NOTES
Assisted to standing to void 300 ml voided. Pt assisted back to bed. Impulsive bed alarm remains in place.

## 2024-02-22 NOTE — ED NOTES
Discharged with son to [facility]. Pt agitated and needing redirected to get into car. [facility] called and report given to RN

## 2024-03-02 PROBLEM — R13.19 OTHER DYSPHAGIA: Status: ACTIVE | Noted: 2024-03-02

## 2024-04-11 PROBLEM — E78.5 HLD (HYPERLIPIDEMIA): Status: ACTIVE | Noted: 2024-04-11

## 2024-04-11 PROBLEM — R73.9 HYPERGLYCEMIA: Status: ACTIVE | Noted: 2024-04-11

## 2024-04-11 PROBLEM — R53.83 FATIGUE: Status: ACTIVE | Noted: 2024-04-11

## 2024-06-05 ENCOUNTER — APPOINTMENT (OUTPATIENT)
Dept: RADIOLOGY | Facility: MEDICAL CENTER | Age: 84
End: 2024-06-05
Payer: MEDICARE

## 2024-06-05 ENCOUNTER — HOSPITAL ENCOUNTER (EMERGENCY)
Facility: MEDICAL CENTER | Age: 84
End: 2024-06-05
Attending: EMERGENCY MEDICINE
Payer: MEDICARE

## 2024-06-05 VITALS
HEIGHT: 70 IN | DIASTOLIC BLOOD PRESSURE: 88 MMHG | HEART RATE: 72 BPM | BODY MASS INDEX: 22.62 KG/M2 | OXYGEN SATURATION: 94 % | RESPIRATION RATE: 18 BRPM | TEMPERATURE: 96.9 F | SYSTOLIC BLOOD PRESSURE: 121 MMHG | WEIGHT: 158 LBS

## 2024-06-05 DIAGNOSIS — R60.0 PERIPHERAL EDEMA: ICD-10-CM

## 2024-06-05 DIAGNOSIS — L03.114 CELLULITIS OF LEFT UPPER EXTREMITY: ICD-10-CM

## 2024-06-05 DIAGNOSIS — Z86.59 HISTORY OF DEMENTIA: ICD-10-CM

## 2024-06-05 LAB
ALBUMIN SERPL BCP-MCNC: 3.5 G/DL (ref 3.2–4.9)
ALBUMIN/GLOB SERPL: 1.2 G/DL
ALP SERPL-CCNC: 59 U/L (ref 30–99)
ALT SERPL-CCNC: 9 U/L (ref 2–50)
ANION GAP SERPL CALC-SCNC: 11 MMOL/L (ref 7–16)
APPEARANCE UR: CLEAR
AST SERPL-CCNC: 24 U/L (ref 12–45)
BASOPHILS # BLD AUTO: 0.9 % (ref 0–1.8)
BASOPHILS # BLD: 0.05 K/UL (ref 0–0.12)
BILIRUB SERPL-MCNC: 1.5 MG/DL (ref 0.1–1.5)
BILIRUB UR QL STRIP.AUTO: NEGATIVE
BUN SERPL-MCNC: 16 MG/DL (ref 8–22)
CALCIUM ALBUM COR SERPL-MCNC: 8.7 MG/DL (ref 8.5–10.5)
CALCIUM SERPL-MCNC: 8.3 MG/DL (ref 8.5–10.5)
CHLORIDE SERPL-SCNC: 108 MMOL/L (ref 96–112)
CO2 SERPL-SCNC: 22 MMOL/L (ref 20–33)
COLOR UR: YELLOW
CREAT SERPL-MCNC: 0.92 MG/DL (ref 0.5–1.4)
EKG IMPRESSION: NORMAL
EOSINOPHIL # BLD AUTO: 0.52 K/UL (ref 0–0.51)
EOSINOPHIL NFR BLD: 9.1 % (ref 0–6.9)
ERYTHROCYTE [DISTWIDTH] IN BLOOD BY AUTOMATED COUNT: 54 FL (ref 35.9–50)
GFR SERPLBLD CREATININE-BSD FMLA CKD-EPI: 82 ML/MIN/1.73 M 2
GLOBULIN SER CALC-MCNC: 3 G/DL (ref 1.9–3.5)
GLUCOSE SERPL-MCNC: 109 MG/DL (ref 65–99)
GLUCOSE UR STRIP.AUTO-MCNC: NEGATIVE MG/DL
HCT VFR BLD AUTO: 38.6 % (ref 42–52)
HGB BLD-MCNC: 12.8 G/DL (ref 14–18)
IMM GRANULOCYTES # BLD AUTO: 0.02 K/UL (ref 0–0.11)
IMM GRANULOCYTES NFR BLD AUTO: 0.3 % (ref 0–0.9)
KETONES UR STRIP.AUTO-MCNC: NEGATIVE MG/DL
LEUKOCYTE ESTERASE UR QL STRIP.AUTO: NEGATIVE
LYMPHOCYTES # BLD AUTO: 0.73 K/UL (ref 1–4.8)
LYMPHOCYTES NFR BLD: 12.7 % (ref 22–41)
MCH RBC QN AUTO: 30.5 PG (ref 27–33)
MCHC RBC AUTO-ENTMCNC: 33.2 G/DL (ref 32.3–36.5)
MCV RBC AUTO: 92.1 FL (ref 81.4–97.8)
MICRO URNS: NORMAL
MONOCYTES # BLD AUTO: 1.31 K/UL (ref 0–0.85)
MONOCYTES NFR BLD AUTO: 22.9 % (ref 0–13.4)
NEUTROPHILS # BLD AUTO: 3.1 K/UL (ref 1.82–7.42)
NEUTROPHILS NFR BLD: 54.1 % (ref 44–72)
NITRITE UR QL STRIP.AUTO: NEGATIVE
NRBC # BLD AUTO: 0 K/UL
NRBC BLD-RTO: 0 /100 WBC (ref 0–0.2)
NT-PROBNP SERPL IA-MCNC: 172 PG/ML (ref 0–125)
PH UR STRIP.AUTO: 6 [PH] (ref 5–8)
PLATELET # BLD AUTO: 169 K/UL (ref 164–446)
PMV BLD AUTO: 10.6 FL (ref 9–12.9)
POTASSIUM SERPL-SCNC: 4.2 MMOL/L (ref 3.6–5.5)
PROT SERPL-MCNC: 6.5 G/DL (ref 6–8.2)
PROT UR QL STRIP: NEGATIVE MG/DL
RBC # BLD AUTO: 4.19 M/UL (ref 4.7–6.1)
RBC UR QL AUTO: NEGATIVE
SODIUM SERPL-SCNC: 141 MMOL/L (ref 135–145)
SP GR UR STRIP.AUTO: 1.02
UROBILINOGEN UR STRIP.AUTO-MCNC: 0.2 MG/DL
WBC # BLD AUTO: 5.7 K/UL (ref 4.8–10.8)

## 2024-06-05 PROCEDURE — 93005 ELECTROCARDIOGRAM TRACING: CPT | Performed by: EMERGENCY MEDICINE

## 2024-06-05 PROCEDURE — 83880 ASSAY OF NATRIURETIC PEPTIDE: CPT

## 2024-06-05 PROCEDURE — 71045 X-RAY EXAM CHEST 1 VIEW: CPT

## 2024-06-05 PROCEDURE — 36415 COLL VENOUS BLD VENIPUNCTURE: CPT

## 2024-06-05 PROCEDURE — 80053 COMPREHEN METABOLIC PANEL: CPT

## 2024-06-05 PROCEDURE — 85025 COMPLETE CBC W/AUTO DIFF WBC: CPT

## 2024-06-05 PROCEDURE — 93005 ELECTROCARDIOGRAM TRACING: CPT

## 2024-06-05 PROCEDURE — 99285 EMERGENCY DEPT VISIT HI MDM: CPT

## 2024-06-05 PROCEDURE — 81003 URINALYSIS AUTO W/O SCOPE: CPT

## 2024-06-05 ASSESSMENT — FIBROSIS 4 INDEX: FIB4 SCORE: 2.77

## 2024-06-05 NOTE — ED TRIAGE NOTES
Elie Daigle  83 y.o.  male  Chief Complaint   Patient presents with    Leg Swelling     Pt brought in from [facility] for increased leg swelling x 2 days. Hx CHF, on lasix. Hx dementia, increased confusion per family.    Elbow Pain     Redness to L elbow, being treated for cellulitis currently with moxifloxacin.     Pt BIB [facility] from [facility]. Family en route. Ventricular paced on monitor. Oriented to self only.

## 2024-06-05 NOTE — ED NOTES
Dr. Pastor at bedside, family now at bedside. Pt's brief changed (incontinent of urine), clean catch obtained in urinal & sent for UA.

## 2024-06-05 NOTE — ED PROVIDER NOTES
ED Provider Note    CHIEF COMPLAINT  Chief Complaint   Patient presents with    Leg Swelling     Pt brought in from [facility] for increased leg swelling x 2 days. Hx CHF, on lasix. Hx dementia, increased confusion per family.    Elbow Pain     Redness to L elbow, being treated for cellulitis currently with moxifloxacin.       EXTERNAL RECORDS REVIEWED  Patient's last encounter was in the geriatric clinic patient was seen with a known history of vascular dementia, hyperglycemia, hypothyroidism and BPH.  This was May 14 of this year.    Prior to that patient was seen in the emergency department February 21 of this year for choking episode, chest pain and history of dementia with behavioral disturbance.    HPI/ROS  LIMITATION TO HISTORY   Select: Dementia  OUTSIDE HISTORIAN(S):  Family Daughter, granddaughter    Elie Daigle is a 83 y.o. male who presents to the emergency department via EMS from [facility] where he has lived for the previous 18 months.  His daughter who provides the bulk of the history, states he just has not been himself today.  This morning he demonstrated decreased activity, now towards the afternoon, he seems to be more agitated.  There is been no recent report of falls.  No fever, vomiting or diarrhea noted.  He is typically a very good eater.  He is not oxygen dependent.  On a good day she states he can talk but he makes no sense and cannot hold a conversation, this has been the case for months now.  He has advanced dementia.  He typically ambulates around the facility and though he does not have a steady gait, he has not experienced many falls.  He does have a history of lower extremity edema but never been diagnosed with heart failure.  He previously was on Lasix 40 mg now he is down to 20 mg.  He does have a pacemaker in place and has a history of atrial fibrillation.  He has a POLST on file and his daughter notes that it is up-to-date.  Recently, was treated  "for a left elbow cellulitis.  It apparently, had a small abscess that opened and drained on its own.  He is on a 10-day course of antibiotics and she notes that it looks markedly better.    PAST MEDICAL HISTORY   has a past medical history of Arthritis, ASTHMA, Atrial fibrillation (HCC), BPH (benign prostatic hyperplasia), Chronic bronchitis, Chronic obstructive pulmonary disease (HCC), Dementia ([facility]), Heart murmur, Memory loss, Mitral and aortic valve regurgitation, Pacemaker, RLS (restless legs syndrome), Stroke ([facility]), Thyroid disease, and Urinary incontinence.    SURGICAL HISTORY   has a past surgical history that includes hernia repair ([id number]) and pacemaker insertion (2019).    FAMILY HISTORY  Family History   Problem Relation Age of Onset    Heart Disease Mother     Cancer Father        SOCIAL HISTORY  Social History     Tobacco Use    Smoking status: Former    Smokeless tobacco: Not on file   Substance and Sexual Activity    Alcohol use: No     Alcohol/week: 2.5 oz     Types: 5 Cans of beer per week     Comment: week    Drug use: No    Sexual activity: Never       CURRENT MEDICATIONS  Home Medications    **Home medications have not yet been reviewed for this encounter**       Audit from Redirected Encounters    **Home medications have not yet been reviewed for this encounter**         ALLERGIES  Allergies   Allergen Reactions    Pcn [Penicillins] Hives and Swelling    Pcn [Penicillins]        PHYSICAL EXAM  VITAL SIGNS: /75   Pulse 75   Temp 36.7 °C (98 °F) (Temporal)   Resp 16   Ht 1.778 m (5' 10\")   Wt 71.7 kg (158 lb)   SpO2 92%   BMI 22.67 kg/m²    Vitals reviewed.  Constitutional: Patient is oriented to person. Appears well-developed and well-nourished. Agitated, but family is able to redirect him somewhat.  He is does not provide any meaningful history to myself.  Head: Normocephalic and atraumatic.   Ears: Normal external ears bilaterally.   Mouth/Throat: Oropharynx is clear   Eyes: " Conjunctivae are normal. Pupils are equal, round, and reactive to light.   Neck: Normal range of motion.  Cardiovascular: Normal rate, regular rhythm and normal heart sounds. Normal peripheral pulses.  Mild, symmetric appearing bilateral lower extremity swelling, nonpitting.  Pulmonary/Chest: Effort normal and breath sounds normal. No respiratory distress, no wheezes  Abdominal: Soft. Bowel sounds are normal. There is no grimace with palpation.  No apparent tenderness.  No rebound or guarding, or peritoneal signs. No CVA tenderness.  Musculoskeletal: No edema and no tenderness.   Neurological: No cranial nerve deficits, on passive exam.  Gait not tested.  Limited exam due to patient's dementia and cooperation with exam.  Moving all extremities, No focal deficits.   Skin: Skin is warm and dry. No erythema. No pallor.   Psychiatric: Difficult to assess, does not want me to get near him or examine him.    EKG/LABS  Results for orders placed or performed during the hospital encounter of 06/05/24   CBC with Differential   Result Value Ref Range    WBC 5.7 4.8 - 10.8 K/uL    RBC 4.19 (L) 4.70 - 6.10 M/uL    Hemoglobin 12.8 (L) 14.0 - 18.0 g/dL    Hematocrit 38.6 (L) 42.0 - 52.0 %    MCV 92.1 81.4 - 97.8 fL    MCH 30.5 27.0 - 33.0 pg    MCHC 33.2 32.3 - 36.5 g/dL    RDW 54.0 (H) 35.9 - 50.0 fL    Platelet Count 169 164 - 446 K/uL    MPV 10.6 9.0 - 12.9 fL    Neutrophils-Polys 54.10 44.00 - 72.00 %    Lymphocytes 12.70 (L) 22.00 - 41.00 %    Monocytes 22.90 (H) 0.00 - 13.40 %    Eosinophils 9.10 (H) 0.00 - 6.90 %    Basophils 0.90 0.00 - 1.80 %    Immature Granulocytes 0.30 0.00 - 0.90 %    Nucleated RBC 0.00 0.00 - 0.20 /100 WBC    Neutrophils (Absolute) 3.10 1.82 - 7.42 K/uL    Lymphs (Absolute) 0.73 (L) 1.00 - 4.80 K/uL    Monos (Absolute) 1.31 (H) 0.00 - 0.85 K/uL    Eos (Absolute) 0.52 (H) 0.00 - 0.51 K/uL    Baso (Absolute) 0.05 0.00 - 0.12 K/uL    Immature Granulocytes (abs) 0.02 0.00 - 0.11 K/uL    NRBC (Absolute)  0.00 K/uL   Comp Metabolic Panel   Result Value Ref Range    Sodium 141 135 - 145 mmol/L    Potassium 4.2 3.6 - 5.5 mmol/L    Chloride 108 96 - 112 mmol/L    Co2 22 20 - 33 mmol/L    Anion Gap 11.0 7.0 - 16.0    Glucose 109 (H) 65 - 99 mg/dL    Bun 16 8 - 22 mg/dL    Creatinine 0.92 0.50 - 1.40 mg/dL    Calcium 8.3 (L) 8.5 - 10.5 mg/dL    Correct Calcium 8.7 8.5 - 10.5 mg/dL    AST(SGOT) 24 12 - 45 U/L    ALT(SGPT) 9 2 - 50 U/L    Alkaline Phosphatase 59 30 - 99 U/L    Total Bilirubin 1.5 0.1 - 1.5 mg/dL    Albumin 3.5 3.2 - 4.9 g/dL    Total Protein 6.5 6.0 - 8.2 g/dL    Globulin 3.0 1.9 - 3.5 g/dL    A-G Ratio 1.2 g/dL   proBrain Natriuretic Peptide, NT   Result Value Ref Range    NT-proBNP 172 (H) 0 - 125 pg/mL   URINALYSIS,CULTURE IF INDICATED    Specimen: Urine, Clean Catch   Result Value Ref Range    Color Yellow     Character Clear     Specific Gravity 1.025 <1.035    Ph 6.0 5.0 - 8.0    Glucose Negative Negative mg/dL    Ketones Negative Negative mg/dL    Protein Negative Negative mg/dL    Bilirubin Negative Negative    Urobilinogen, Urine 0.2 Negative    Nitrite Negative Negative    Leukocyte Esterase Negative Negative    Occult Blood Negative Negative    Micro Urine Req see below    ESTIMATED GFR   Result Value Ref Range    GFR (CKD-EPI) 82 >60 mL/min/1.73 m 2   EKG   Result Value Ref Range    Report       [facility] Emergency Dept.    Test Date:  2024  Pt Name:    URIEL EID                Department: ER  MRN:        [id number]                      Room:        37  Gender:     Male                         Technician: [id number]  :        [id number]                   Requested By:ER TRIAGE PROTOCOL  Order #:    [id number]                    [place]inson MD: RAÚL DOSHI, DO    Measurements  Intervals                                Axis  Rate:       83                           P:          -25  WI:         112                          QRS:        -83  QRSD:       180                           T:          98  QT:         464  QTc:        546    Interpretive Statements  A-V dual-paced rhythm with some inhibition  No further analysis attempted due to paced rhythm  Compared to ECG 02/21/2024 13:50:59  Atrial-sensed ventricular-paced complex(es) or rhythm no longer present  Electronically Signed On 06- 16:47:52 PDT by RAÚL DOSHI, DO       I have independently interpreted this EKG    RADIOLOGY/PROCEDURES   I have independently interpreted the diagnostic imaging associated with this visit and am waiting the final reading from the radiologist.   My preliminary interpretation is as follows: CM, pacemaker in place, increased vascular markings noted bilaterally    Radiologist interpretation:  DX-CHEST-PORTABLE (1 VIEW)   Final Result      No acute cardiopulmonary abnormality identified.          COURSE & MEDICAL DECISION MAKING    ASSESSMENT, COURSE AND PLAN  Care Narrative:     This is an 83-year-old male who lives at a memory care facility.  He has a history of advanced dementia.  Family note that he had a combination of decreased activity, seemingly generalized weakness it is required more staff to help him get up and move around when he normally ambulates without difficulty.  And now, somewhat more agitation.  They are concerned about the swelling in his lower extremities.  Daughter does note that the elbow seems to be improved.  He has some very mild swelling to the elbow and an area where there is obviously been a wound that is very shallow now.  No history of CHF he is on a low-dose of a diuretic he has a history of AF and a pacer.  EKG shows a paced rhythm.  His vital signs are otherwise reassuring.  Urine was obtained.    Data reviewed labs are reassuring.  White blood cell count is normal 5.7.  H&H 12 and 38, there is no neutrophilic shift.  Chemistry is reassuring, slight elevation in his glucose of 109.  , as expected, clinical exam though he does have swollen lower extremities,  this is not pitting edema and less suspicious for heart failure.  Chest x-ray shows cardiomegaly but no acute abnormalities.  Urine analysis does not show evidence of UTI.    5:31 PM patient's reevaluated at the bedside.  Family remains at the bedside.  Patient seems to be back to his baseline, fidgeting, more active.  I discussed with daughter, results which include a reassuring BNP.  Clinically, his swelling is not suspicious for heart failure I suspect it is more likely peripheral edema, perhaps vascular insufficiency.  Unfortunately, elevating his legs, wearing compression stockings are not an option due to his dementia.  They have doubled his dose of diuretic in the past and he has responded well to this although he has increased urination as expected.  I have recommended this for the next 5 days.  He has reassuring vital signs.  His heart rate 68.  His oxygen saturation is 94% on room air.  He is in stable condition.  I anticipate discharge to home shortly.    ADDITIONAL PROBLEMS MANAGED    DISPOSITION AND DISCUSSIONS  I have discussed management of the patient with the following physicians and ROB's:  None    Discussion of management with other [facility] or appropriate source(s): None     Escalation of care considered, and ultimately not performed:acute inpatient care management, however at this time, the patient is most appropriate for outpatient management    Barriers to care at this time, including but not limited to:  Dementia.     Decision tools and prescription drugs considered including, but not limited to: None.    FINAL DIAGNOSIS  1. Peripheral edema    2. History of dementia    3. Cellulitis of left upper extremity    - resolving       Electronically signed by: Cassia Pastor D.O., 6/5/2024 4:09 PM

## 2024-06-06 NOTE — DISCHARGE INSTRUCTIONS
Lab workup shows reassuring electrolytes.  White blood cell count was normal.  Finish antibiotics.  There is no evidence of heart failure at this time.  Swelling seems to be peripheral in nature.  No evidence of urinary tract infection on lab evaluation.    Recommend that you increase your diuretic from 20 mg daily to 20 mg twice daily for the next 5 days.

## 2024-06-06 NOTE — ED NOTES
Patient discharged home per ERP.  Discharge teaching and education discussed with patient & family. POC discussed.   Family (daughter & granddaughter) verbalized understanding of discharge teaching and education. No other questions at this time.   PIV removed.     [facility]. [facility] aware of patient discharge & plan to return to facility. Pt assisted back into family's car. Stable.

## 2024-06-10 PROBLEM — T63.301A SPIDER BITE: Status: ACTIVE | Noted: 2024-06-10

## 2024-06-10 PROBLEM — L03.90 CELLULITIS: Status: ACTIVE | Noted: 2024-06-10

## 2024-06-18 PROBLEM — I51.89 ECHOCARDIOGRAM SHOWS LEFT VENTRICULAR DIASTOLIC DYSFUNCTION: Status: ACTIVE | Noted: 2024-06-18

## 2024-07-30 ENCOUNTER — HOSPITAL ENCOUNTER (EMERGENCY)
Facility: MEDICAL CENTER | Age: 84
End: 2024-07-30
Attending: EMERGENCY MEDICINE
Payer: MEDICARE

## 2024-07-30 ENCOUNTER — APPOINTMENT (OUTPATIENT)
Dept: RADIOLOGY | Facility: MEDICAL CENTER | Age: 84
End: 2024-07-30
Attending: EMERGENCY MEDICINE
Payer: MEDICARE

## 2024-07-30 VITALS
HEIGHT: 70 IN | HEART RATE: 60 BPM | RESPIRATION RATE: 19 BRPM | BODY MASS INDEX: 22.62 KG/M2 | DIASTOLIC BLOOD PRESSURE: 78 MMHG | OXYGEN SATURATION: 96 % | WEIGHT: 158 LBS | TEMPERATURE: 98.4 F | SYSTOLIC BLOOD PRESSURE: 127 MMHG

## 2024-07-30 DIAGNOSIS — S93.401A SPRAIN OF RIGHT ANKLE, UNSPECIFIED LIGAMENT, INITIAL ENCOUNTER: ICD-10-CM

## 2024-07-30 PROCEDURE — A9270 NON-COVERED ITEM OR SERVICE: HCPCS | Performed by: EMERGENCY MEDICINE

## 2024-07-30 PROCEDURE — 700102 HCHG RX REV CODE 250 W/ 637 OVERRIDE(OP): Performed by: EMERGENCY MEDICINE

## 2024-07-30 PROCEDURE — 99285 EMERGENCY DEPT VISIT HI MDM: CPT

## 2024-07-30 PROCEDURE — 73610 X-RAY EXAM OF ANKLE: CPT | Mod: RT

## 2024-07-30 RX ORDER — ACETAMINOPHEN 325 MG/1
325 TABLET ORAL EVERY 4 HOURS PRN
Qty: 30 TABLET | Refills: 0 | Status: SHIPPED | OUTPATIENT
Start: 2024-07-30

## 2024-07-30 RX ORDER — ACETAMINOPHEN 325 MG/1
650 TABLET ORAL ONCE
Status: COMPLETED | OUTPATIENT
Start: 2024-07-30 | End: 2024-07-30

## 2024-07-30 RX ADMIN — ACETAMINOPHEN 650 MG: 325 TABLET ORAL at 13:00

## 2024-07-30 ASSESSMENT — FIBROSIS 4 INDEX: FIB4 SCORE: 3.93

## 2024-07-30 ASSESSMENT — PAIN DESCRIPTION - PAIN TYPE: TYPE: ACUTE PAIN

## 2024-07-30 NOTE — ED PROVIDER NOTES
ED Provider Note    CHIEF COMPLAINT  Chief Complaint   Patient presents with    Leg Pain       EXTERNAL RECORDS REVIEWED  Outpatient Notes patient was seen by geriatric specialty care 6/18/2024 for lower extremity edema anticoagulation left ventricular diastolic dysfunction and a spider bite the patient's last echocardiogram showed an ejection fraction of 55-60 this echo was performed in 2019 his BNP was less than 200 in the emergency department 6/5/2024 and they thought he had vascular dependent edema and was advised that he continue his regular scheduled medications including digoxin furosemide and Plavix it was recommended that he elevate his legs and wear compression stockings but he cannot remember to do that because he has dementia    HPI/ROS  LIMITATION TO HISTORY   Select: Altered mental status / Confusion patient has dementia  OUTSIDE HISTORIAN(S):  Family patient's daughter is here and gives me the patient's history because he has severe dementia    Elie Daigle is a 83 y.o. male who presents in the care of his daughter after he was found limping at his memory care facility.  The patient has advanced dementia and cannot elicit whether he fell or twisted his ankle.  He was brought here for further evaluation of his right ankle.  Currently he has no complaints    PAST MEDICAL HISTORY   has a past medical history of Arthritis, ASTHMA, Atrial fibrillation (HCC), BPH (benign prostatic hyperplasia), Chronic bronchitis, Chronic obstructive pulmonary disease (HCC), Dementia (HCC), Heart murmur, Memory loss, Mitral and aortic valve regurgitation, Pacemaker, RLS (restless legs syndrome), Stroke (HCC), Thyroid disease, and Urinary incontinence.    SURGICAL HISTORY   has a past surgical history that includes hernia repair ([id number]) and pacemaker insertion (2019).    FAMILY HISTORY  Family History   Problem Relation Age of Onset    Heart Disease Mother     Cancer Father        SOCIAL HISTORY  Social History  "    Tobacco Use    Smoking status: Former    Smokeless tobacco: Not on file   Substance and Sexual Activity    Alcohol use: No     Alcohol/week: 2.5 oz     Types: 5 Cans of beer per week     Comment: week    Drug use: No    Sexual activity: Never       CURRENT MEDICATIONS  Home Medications       Reviewed by Mildred Vee R.N. (Registered Nurse) on 07/30/24 at 1129  Med List Status: Not Addressed     Medication Last Dose Status   atorvastatin (LIPITOR) 20 MG Tab  Active   clopidogrel (PLAVIX) 75 MG Tab  Active   digoxin (LANOXIN) 125 MCG Tab  Active   famotidine (PEPCID) 20 MG Tab  Active   FIBER PO  Active   finasteride (PROSCAR) 5 MG Tab  Active   fluticasone (FLONASE) 50 MCG/ACT nasal spray  Active   furosemide (LASIX) 20 MG Tab  Active   levothyroxine (SYNTHROID) 100 MCG Tab  Active   moxifloxacin (AVELOX) 400 MG Tab  Active   multivitamin (THERAGRAN) Tab  Active   QUEtiapine (SEROQUEL) 25 MG Tab  Active   QUEtiapine (SEROQUEL) 50 MG tablet  Active   terazosin (HYTRIN) 10 MG capsule  Active   traZODone (DESYREL) 100 MG Tab  Active   traZODone (DESYREL) 50 MG Tab  Active                    ALLERGIES  Allergies   Allergen Reactions    Pcn [Penicillins] Hives and Swelling    Pcn [Penicillins]        PHYSICAL EXAM  VITAL SIGNS: /56   Pulse 65   Temp 37.1 °C (98.8 °F) (Temporal)   Resp 15   Ht 1.778 m (5' 10\")   Wt 71.7 kg (158 lb)   SpO2 95%   BMI 22.67 kg/m²      Constitutional: No distress  HEENT: Normocephalic atraumatic  Skin: Pink warm and dry no contusions or abrasions  Musculoskeletal: No obvious tenderness contusion or edema or erythema to the patient's right ankle no ligamentous instability distally he is neurovascular intact no bony step-offs or crepitance  Vascular: warm to touch good capillary refill   Neurologic: distally neurovascularly intact  Psychiatric: Affect normal        EKG/LABS  none  I have independently interpreted this EKG    RADIOLOGY/PROCEDURES   I have independently " interpreted the diagnostic imaging associated with this visit and am waiting the final reading from the radiologist.   My preliminary interpretation is as follows: xray ankle no fracture or dislocation    Radiologist interpretation:  DX-ANKLE 3+ VIEWS RIGHT   Final Result      1. Marked degenerative changes in the second tarsometatarsal joint.   2. Decreased bone mineralization.   3. No acute fracture.          COURSE & MEDICAL DECISION MAKING    ASSESSMENT, COURSE AND PLAN  Care Narrative: Elie Daigle is a 83 y.o. male who presents in the care of his daughter after he was found limping at his memory care facility.  The patient has advanced dementia and cannot elicit whether he fell or twisted his ankle.  He was brought here for further evaluation of his right ankle.  Currently he has no complaints.  Patient has no evidence of head injury.  He has no bony step-offs or crepitance contusions or edema to his right lower ankle.  He does not have any pain with range of motion and distally appears neurovascularly intact.              ADDITIONAL PROBLEMS MANAGED  None    DISPOSITION AND DISCUSSIONS    I read to the patient some Tylenol and an x-ray of the right ankle to further evaluate his symptoms because he has dementia we want to make sure he does not have an occult fracture.  This could also just be arthritis.    Patient's x-ray of the ankle shows no fracture or dislocation.  He will be discharged home with an ankle air splint and a prescription for Tylenol as needed for pain.  He does have some osteoarthritis.  I advised his daughter to make sure he ices and elevates it and increase his weightbearing as tolerated.  He will be discharged home in stable condition with follow-up with his primary care physician.    I have discussed management of the patient with the following physicians and ROB's: None    Discussion of management with other [facility] or appropriate source(s): None     Escalation of care considered, and  ultimately not performed:none    Barriers to care at this time, including but not limited to: Patient has severe dementia.     Decision tools and prescription drugs considered including, but not limited to: Pain Medications Tylenol .      The patient will return for new or worsening symptoms and is stable at the time of discharge.    The patient is referred to a primary physician for blood pressure management, diabetic screening, and for all other preventative health concerns.        DISPOSITION:  Patient will be discharged home in stable condition.    FOLLOW UP:  ELIJAH Alvarez  [address] [facility] [id number]  [phone]      As needed      OUTPATIENT MEDICATIONS:  Discharge Medication List as of 7/30/2024  1:52 PM        START taking these medications    Details   acetaminophen (TYLENOL) 325 MG Tab Take 1 Tablet by mouth every four hours as needed for Moderate Pain., Disp-30 Tablet, R-0, Normal             FINAL DIAGNOSIS  1. Sprain of right ankle, unspecified ligament, initial encounter         Electronically signed by: Elaine Salazar M.D., 7/30/2024 12:11 PM

## 2024-07-30 NOTE — ED NOTES
Ankle air splint applied to patients Right ankle without incident.. CMS intact before and after splint application.

## 2024-07-30 NOTE — ED NOTES
Pt discharged to home. Discharge paperwork provided to daughter. Education provided by ERP. Reinforced discharge instructions.  Pt was given follow up instructions and prescriptions.  Daughter verbalized understanding of all instructions for discharge.    all belongings with patient.

## 2024-07-30 NOTE — ED TRIAGE NOTES
"Chief Complaint   Patient presents with    Leg Pain     BIB EMS from [facility]. Staff was walking patient and had twisted his right ankle and assisted patient chair; No GLF per EMS; on thinners.     AOX1, h/0 dementia;     /56   Pulse 65   Temp 37.1 °C (98.8 °F) (Temporal)   Resp 15   Ht 1.778 m (5' 10\")   Wt 71.7 kg (158 lb)   SpO2 95%   BMI 22.67 kg/m²     "
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
## 2024-08-06 PROBLEM — B86 SCABIES: Status: ACTIVE | Noted: 2024-08-06

## 2024-09-30 ENCOUNTER — APPOINTMENT (RX ONLY)
Dept: URBAN - METROPOLITAN AREA CLINIC 22 | Facility: CLINIC | Age: 84
Setting detail: DERMATOLOGY
End: 2024-09-30

## 2024-09-30 DIAGNOSIS — D18.0 HEMANGIOMA: ICD-10-CM

## 2024-09-30 DIAGNOSIS — Z71.89 OTHER SPECIFIED COUNSELING: ICD-10-CM

## 2024-09-30 DIAGNOSIS — D22 MELANOCYTIC NEVI: ICD-10-CM

## 2024-09-30 DIAGNOSIS — L30.0 NUMMULAR DERMATITIS: ICD-10-CM | Status: INADEQUATELY CONTROLLED

## 2024-09-30 DIAGNOSIS — L82.1 OTHER SEBORRHEIC KERATOSIS: ICD-10-CM

## 2024-09-30 DIAGNOSIS — L81.4 OTHER MELANIN HYPERPIGMENTATION: ICD-10-CM

## 2024-09-30 PROBLEM — D18.01 HEMANGIOMA OF SKIN AND SUBCUTANEOUS TISSUE: Status: ACTIVE | Noted: 2024-09-30

## 2024-09-30 PROBLEM — D22.5 MELANOCYTIC NEVI OF TRUNK: Status: ACTIVE | Noted: 2024-09-30

## 2024-09-30 PROCEDURE — 99214 OFFICE O/P EST MOD 30 MIN: CPT

## 2024-09-30 PROCEDURE — ? PRESCRIPTION

## 2024-09-30 PROCEDURE — ? SUNSCREEN RECOMMENDATIONS

## 2024-09-30 PROCEDURE — ? COUNSELING

## 2024-09-30 PROCEDURE — ? TREATMENT REGIMEN

## 2024-09-30 RX ORDER — TRIAMCINOLONE ACETONIDE 1 MG/G
CREAM TOPICAL
Qty: 80 | Refills: 2 | COMMUNITY
Start: 2024-09-30

## 2024-09-30 RX ADMIN — TRIAMCINOLONE ACETONIDE: 1 CREAM TOPICAL at 00:00

## 2024-09-30 ASSESSMENT — LOCATION SIMPLE DESCRIPTION DERM
LOCATION SIMPLE: LEFT UPPER BACK
LOCATION SIMPLE: ABDOMEN
LOCATION SIMPLE: RIGHT LOWER BACK
LOCATION SIMPLE: LEFT PRETIBIAL REGION
LOCATION SIMPLE: LEFT UPPER ARM
LOCATION SIMPLE: LEFT THIGH
LOCATION SIMPLE: RIGHT PRETIBIAL REGION

## 2024-09-30 ASSESSMENT — SEVERITY ASSESSMENT: SEVERITY: MILD TO MODERATE

## 2024-09-30 ASSESSMENT — LOCATION DETAILED DESCRIPTION DERM
LOCATION DETAILED: RIGHT SUPERIOR MEDIAL MIDBACK
LOCATION DETAILED: RIGHT DISTAL PRETIBIAL REGION
LOCATION DETAILED: LEFT SUPERIOR MEDIAL UPPER BACK
LOCATION DETAILED: LEFT PROXIMAL PRETIBIAL REGION
LOCATION DETAILED: LEFT ANTERIOR DISTAL THIGH
LOCATION DETAILED: EPIGASTRIC SKIN
LOCATION DETAILED: LEFT PROXIMAL POSTERIOR UPPER ARM
LOCATION DETAILED: RIGHT PROXIMAL PRETIBIAL REGION

## 2024-09-30 ASSESSMENT — LOCATION ZONE DERM
LOCATION ZONE: TRUNK
LOCATION ZONE: LEG
LOCATION ZONE: ARM